# Patient Record
Sex: FEMALE | Race: WHITE | NOT HISPANIC OR LATINO | URBAN - METROPOLITAN AREA
[De-identification: names, ages, dates, MRNs, and addresses within clinical notes are randomized per-mention and may not be internally consistent; named-entity substitution may affect disease eponyms.]

---

## 2017-01-19 ENCOUNTER — OFFICE (OUTPATIENT)
Dept: URBAN - METROPOLITAN AREA CLINIC 43 | Facility: CLINIC | Age: 52
End: 2017-01-19

## 2017-01-19 VITALS — SYSTOLIC BLOOD PRESSURE: 120 MMHG | DIASTOLIC BLOOD PRESSURE: 76 MMHG | WEIGHT: 172 LBS | HEIGHT: 66 IN

## 2017-01-19 DIAGNOSIS — R14.0 ABDOMINAL BLOATING: ICD-10-CM

## 2017-01-19 DIAGNOSIS — R10.13 EPIGASTRIC PAIN: ICD-10-CM

## 2017-01-19 DIAGNOSIS — K58.9 IRRITABLE BOWEL SYNDROME (IBS): ICD-10-CM

## 2017-01-19 PROCEDURE — 99213 OFFICE O/P EST LOW 20 MIN: CPT | Performed by: INTERNAL MEDICINE

## 2017-01-19 NOTE — SERVICEHPINOTES
I had the pleasure of seeing this patient today.As you know, he is a 51-year-old woman with a history of bloating and gas along with epigastric pain. She had a colonoscopy by me in November 2016 which was essentially normal. She comes back to see me today to discuss her ongoing symptoms. She reports in the past she had been on omeprazole therapy which did not help her epigastric pain however she has not been on this for about 4 or 5 years now. She notes no specific foods seem to make her bloating worse, and in fact, gluten-free diet with only minimal improvement. She is also been on Citrucel which has been of some help but not tremendous relief. She reports she gets constipation alternating with diarrhea.She denies any nausea, vomiting, hematemesis, melena, hematochezia, or unintentional weight loss.

## 2017-01-23 LAB — BREATH TEST: PDF REPORT: (no result)

## 2017-02-22 ENCOUNTER — OFFICE (OUTPATIENT)
Dept: URBAN - METROPOLITAN AREA CLINIC 43 | Facility: CLINIC | Age: 52
End: 2017-02-22

## 2017-02-22 VITALS — HEIGHT: 66 IN | SYSTOLIC BLOOD PRESSURE: 110 MMHG | WEIGHT: 177 LBS | DIASTOLIC BLOOD PRESSURE: 76 MMHG

## 2017-02-22 DIAGNOSIS — R10.13 EPIGASTRIC PAIN: ICD-10-CM

## 2017-02-22 DIAGNOSIS — K58.9 IRRITABLE BOWEL SYNDROME (IBS): ICD-10-CM

## 2017-02-22 DIAGNOSIS — K21.9 GERD: ICD-10-CM

## 2017-02-22 PROCEDURE — 99213 OFFICE O/P EST LOW 20 MIN: CPT | Performed by: INTERNAL MEDICINE

## 2017-02-22 NOTE — SERVICEHPINOTES
I had the pleasure of seeing this patient today.As you know, she is a 51-year-old woman with ongoing symptoms of abdominal pain. She has pain in the epigastric area that radiates retrosternally, as well as separate pains in the lower abdomen, at times intense cramping with some mucousy discharge rectally, but no true diarrhea. She has improved by using Citrucel, a low FODMAP diet, and avoiding gluten, but her symptoms have not completely resolved. A lactulose breath test was negative for small intestinal overgrowth. The patient has been on omeprazole therapy in the past with only modest improvement. Of note is that a colonoscopy done in November of 2016 was completely normal. She has not had an upper endoscopy in many years however.She denies any nausea, vomiting, hematemesis, melena, hematochezia, or unintentional weight loss. She denies any dysphagia.